# Patient Record
Sex: MALE | Race: WHITE
[De-identification: names, ages, dates, MRNs, and addresses within clinical notes are randomized per-mention and may not be internally consistent; named-entity substitution may affect disease eponyms.]

---

## 2018-02-19 ENCOUNTER — HOSPITAL ENCOUNTER (EMERGENCY)
Dept: HOSPITAL 18 - NAV ERS | Age: 23
Discharge: HOME | End: 2018-02-19
Payer: SELF-PAY

## 2018-02-19 DIAGNOSIS — J06.9: Primary | ICD-10-CM

## 2018-02-19 DIAGNOSIS — F17.210: ICD-10-CM

## 2018-02-19 DIAGNOSIS — J45.909: ICD-10-CM

## 2018-02-19 PROCEDURE — 99283 EMERGENCY DEPT VISIT LOW MDM: CPT

## 2018-06-05 ENCOUNTER — HOSPITAL ENCOUNTER (EMERGENCY)
Dept: HOSPITAL 18 - NAV ERS | Age: 23
Discharge: HOME | End: 2018-06-05
Payer: SELF-PAY

## 2018-06-05 DIAGNOSIS — J45.909: ICD-10-CM

## 2018-06-05 DIAGNOSIS — L01.00: Primary | ICD-10-CM

## 2018-06-05 DIAGNOSIS — F17.210: ICD-10-CM

## 2018-06-05 PROCEDURE — 90471 IMMUNIZATION ADMIN: CPT

## 2018-06-05 PROCEDURE — 90715 TDAP VACCINE 7 YRS/> IM: CPT

## 2018-06-11 ENCOUNTER — HOSPITAL ENCOUNTER (EMERGENCY)
Dept: HOSPITAL 18 - NAV ERS | Age: 23
Discharge: HOME | End: 2018-06-11
Payer: SELF-PAY

## 2018-06-11 DIAGNOSIS — F17.210: ICD-10-CM

## 2018-06-11 DIAGNOSIS — Z79.899: ICD-10-CM

## 2018-06-11 DIAGNOSIS — J45.909: ICD-10-CM

## 2018-06-11 DIAGNOSIS — L01.00: Primary | ICD-10-CM

## 2018-06-11 PROCEDURE — 99283 EMERGENCY DEPT VISIT LOW MDM: CPT

## 2018-07-16 ENCOUNTER — HOSPITAL ENCOUNTER (EMERGENCY)
Dept: HOSPITAL 18 - NAV ERS | Age: 23
Discharge: HOME | End: 2018-07-16
Payer: SELF-PAY

## 2018-07-16 DIAGNOSIS — F17.210: ICD-10-CM

## 2018-07-16 DIAGNOSIS — J45.909: ICD-10-CM

## 2018-07-16 DIAGNOSIS — R36.9: Primary | ICD-10-CM

## 2018-07-16 PROCEDURE — 96372 THER/PROPH/DIAG INJ SC/IM: CPT

## 2018-07-16 PROCEDURE — 87591 N.GONORRHOEAE DNA AMP PROB: CPT

## 2018-07-16 PROCEDURE — 87491 CHLMYD TRACH DNA AMP PROBE: CPT

## 2018-07-16 PROCEDURE — 86780 TREPONEMA PALLIDUM: CPT

## 2018-07-16 PROCEDURE — 86593 SYPHILIS TEST NON-TREP QUANT: CPT

## 2018-07-17 LAB
SYPHILIS ANTIBODY INDEX: 27.26 S/CO
T PALLIDUM AB TITR SER: (no result) {TITER}

## 2018-07-19 LAB — N GONORRHOEA DNA SPEC QL NAA+PROBE: DETECTED

## 2018-09-14 ENCOUNTER — HOSPITAL ENCOUNTER (EMERGENCY)
Dept: HOSPITAL 18 - NAV ERS | Age: 23
Discharge: HOME | End: 2018-09-14
Payer: SELF-PAY

## 2018-09-14 DIAGNOSIS — J01.90: Primary | ICD-10-CM

## 2018-09-14 DIAGNOSIS — F17.210: ICD-10-CM

## 2018-09-14 DIAGNOSIS — J45.909: ICD-10-CM

## 2018-09-14 PROCEDURE — 99283 EMERGENCY DEPT VISIT LOW MDM: CPT

## 2018-12-14 ENCOUNTER — HOSPITAL ENCOUNTER (EMERGENCY)
Dept: HOSPITAL 18 - NAV ERS | Age: 23
Discharge: HOME | End: 2018-12-14
Payer: SELF-PAY

## 2018-12-14 DIAGNOSIS — J06.9: Primary | ICD-10-CM

## 2018-12-14 DIAGNOSIS — F17.210: ICD-10-CM

## 2018-12-14 DIAGNOSIS — J45.909: ICD-10-CM

## 2018-12-14 PROCEDURE — 99281 EMR DPT VST MAYX REQ PHY/QHP: CPT

## 2018-12-31 ENCOUNTER — HOSPITAL ENCOUNTER (EMERGENCY)
Dept: HOSPITAL 18 - NAV ERS | Age: 23
Discharge: HOME | End: 2018-12-31
Payer: SELF-PAY

## 2018-12-31 DIAGNOSIS — R05: Primary | ICD-10-CM

## 2018-12-31 DIAGNOSIS — J45.909: ICD-10-CM

## 2018-12-31 DIAGNOSIS — R50.9: ICD-10-CM

## 2018-12-31 DIAGNOSIS — F17.210: ICD-10-CM

## 2018-12-31 PROCEDURE — 71046 X-RAY EXAM CHEST 2 VIEWS: CPT

## 2018-12-31 NOTE — RAD
TWO VIEWS CHEST:

 

DATE: 12/31/2018.

 

PROVIDED CLINICAL HISTORY: 

Cough and congestion.

 

FINDINGS: 

Cardiac and mediastinal silhouette is within normal limits.  Lungs appear clear.  No pleural fluid or
 pneumothorax apparent.

 

IMPRESSION: 

No evidence for an acute cardiopulmonary process.

 

POS: C

## 2019-03-10 ENCOUNTER — HOSPITAL ENCOUNTER (EMERGENCY)
Dept: HOSPITAL 18 - NAV ERS | Age: 24
Discharge: HOME | End: 2019-03-10
Payer: SELF-PAY

## 2019-03-10 DIAGNOSIS — F17.210: ICD-10-CM

## 2019-03-10 DIAGNOSIS — B34.9: Primary | ICD-10-CM

## 2019-03-10 DIAGNOSIS — J45.909: ICD-10-CM

## 2019-03-10 PROCEDURE — 99281 EMR DPT VST MAYX REQ PHY/QHP: CPT

## 2019-12-09 ENCOUNTER — HOSPITAL ENCOUNTER (EMERGENCY)
Dept: HOSPITAL 18 - NAV ERS | Age: 24
Discharge: HOME | End: 2019-12-09
Payer: SELF-PAY

## 2019-12-09 DIAGNOSIS — F17.210: ICD-10-CM

## 2019-12-09 DIAGNOSIS — R11.0: ICD-10-CM

## 2019-12-09 DIAGNOSIS — R05: Primary | ICD-10-CM

## 2019-12-09 DIAGNOSIS — J45.909: ICD-10-CM

## 2019-12-09 PROCEDURE — 99281 EMR DPT VST MAYX REQ PHY/QHP: CPT

## 2020-07-31 ENCOUNTER — HOSPITAL ENCOUNTER (EMERGENCY)
Dept: HOSPITAL 18 - NAV ERS | Age: 25
Discharge: HOME | End: 2020-07-31
Payer: SELF-PAY

## 2020-07-31 DIAGNOSIS — Z20.828: Primary | ICD-10-CM

## 2020-07-31 DIAGNOSIS — J45.909: ICD-10-CM

## 2020-07-31 PROCEDURE — U0003 INFECTIOUS AGENT DETECTION BY NUCLEIC ACID (DNA OR RNA); SEVERE ACUTE RESPIRATORY SYNDROME CORONAVIRUS 2 (SARS-COV-2) (CORONAVIRUS DISEASE [COVID-19]), AMPLIFIED PROBE TECHNIQUE, MAKING USE OF HIGH THROUGHPUT TECHNOLOGIES AS DESCRIBED BY CMS-2020-01-R: HCPCS

## 2020-07-31 PROCEDURE — 87635 SARS-COV-2 COVID-19 AMP PRB: CPT

## 2020-07-31 PROCEDURE — 99283 EMERGENCY DEPT VISIT LOW MDM: CPT

## 2020-08-29 ENCOUNTER — HOSPITAL ENCOUNTER (EMERGENCY)
Dept: HOSPITAL 18 - NAV ERS | Age: 25
Discharge: HOME | End: 2020-08-29
Payer: COMMERCIAL

## 2020-08-29 DIAGNOSIS — Z79.899: ICD-10-CM

## 2020-08-29 DIAGNOSIS — J36: Primary | ICD-10-CM

## 2020-08-29 DIAGNOSIS — J45.909: ICD-10-CM

## 2020-08-29 LAB
ALBUMIN SERPL BCG-MCNC: 4.6 G/DL (ref 3.5–5)
ALP SERPL-CCNC: 94 U/L (ref 40–110)
ALT SERPL W P-5'-P-CCNC: 13 U/L (ref 8–55)
ANION GAP SERPL CALC-SCNC: 16 MMOL/L (ref 10–20)
AST SERPL-CCNC: 15 U/L (ref 5–34)
BASOPHILS # BLD AUTO: 0.1 THOU/UL (ref 0–0.2)
BASOPHILS NFR BLD AUTO: 0.5 % (ref 0–1)
BILIRUB SERPL-MCNC: 0.8 MG/DL (ref 0.2–1.2)
BUN SERPL-MCNC: 6 MG/DL (ref 8.9–20.6)
CALCIUM SERPL-MCNC: 10.1 MG/DL (ref 7.8–10.44)
CHLORIDE SERPL-SCNC: 106 MMOL/L (ref 98–107)
CO2 SERPL-SCNC: 23 MMOL/L (ref 22–29)
CREAT CL PREDICTED SERPL C-G-VRATE: 0 ML/MIN (ref 70–130)
EOSINOPHIL # BLD AUTO: 0.3 THOU/UL (ref 0–0.7)
EOSINOPHIL NFR BLD AUTO: 1.2 % (ref 0–10)
GLOBULIN SER CALC-MCNC: 3.7 G/DL (ref 2.4–3.5)
GLUCOSE SERPL-MCNC: 103 MG/DL (ref 70–105)
HGB BLD-MCNC: 17.8 G/DL (ref 14–18)
LYMPHOCYTES # BLD AUTO: 2.7 THOU/UL (ref 1.2–3.4)
LYMPHOCYTES NFR BLD AUTO: 12.9 % (ref 21–51)
MCH RBC QN AUTO: 31 PG (ref 27–31)
MCV RBC AUTO: 97.1 FL (ref 78–98)
MONOCYTES # BLD AUTO: 1.4 THOU/UL (ref 0.11–0.59)
MONOCYTES NFR BLD AUTO: 6.6 % (ref 0–10)
NEUTROPHILS # BLD AUTO: 16.3 THOU/UL (ref 1.4–6.5)
NEUTROPHILS NFR BLD AUTO: 78.8 % (ref 42–75)
PLATELET # BLD AUTO: 301 THOU/UL (ref 130–400)
POTASSIUM SERPL-SCNC: 4.2 MMOL/L (ref 3.5–5.1)
RBC # BLD AUTO: 5.75 MILL/UL (ref 4.7–6.1)
SODIUM SERPL-SCNC: 141 MMOL/L (ref 136–145)
WBC # BLD AUTO: 20.6 THOU/UL (ref 4.8–10.8)

## 2020-08-29 PROCEDURE — 85025 COMPLETE CBC W/AUTO DIFF WBC: CPT

## 2020-08-29 PROCEDURE — 80053 COMPREHEN METABOLIC PANEL: CPT

## 2020-08-29 PROCEDURE — 96375 TX/PRO/DX INJ NEW DRUG ADDON: CPT

## 2020-08-29 PROCEDURE — 96374 THER/PROPH/DIAG INJ IV PUSH: CPT

## 2020-08-29 PROCEDURE — 83605 ASSAY OF LACTIC ACID: CPT

## 2020-08-29 PROCEDURE — 87430 STREP A AG IA: CPT

## 2020-08-29 PROCEDURE — 70492 CT SFT TSUE NCK W/O & W/DYE: CPT

## 2020-08-29 NOTE — CT
EXAM: CT NECK SOFT TISSUE POST CONTRAST:



HISTORY:Positive strep throat. Swelling. Pain.



COMPARISON:None



CORRELATION:None





FINDINGS:

Brain parenchyma: No pathologic enhancement of the visualized brain parenchyma.





Sinuses: Mucus retention cyst in the right maxillary sinus. Minimal mucosal thickening of the left ma
xillary sinus.



Orbits: Appropriate location of the ocular lenses. Symmetric attenuation the optic nerves and ocular 
rectus muscles. Retrobulbar fat is preserved.



Nasopharynx:Adequate aeration. No mucosal abnormality. 



Oral cavity:There is a submucosal peripherally enhancing centrally hypodense lesion at the level of t
he right tonsillar pillar compatible with a tonsillar/peritonsillar abscess measuring 1.6 x 1.8 cm.

There is associated mass effect and narrowing of the airway. 



Hypopharynx: There is mucosal thickening involving the posterior and right aspect of the hypopharynx.
 There is mild thickening of the epiglottis. There is effacement of the right piriform sinus..



Larynx: True vocal cords appear to have a normal appearance.



Paraspinal muscles: Symmetric attenuation of the paraspinal muscles and symmetric attenuation of the 
sternocleidomastoid muscles..



Parotid and salivary glands: Symmetric attenuation of the parotid and submandibular glands





Thyroid gland: Unremarkable.



Spine: Vertebral body height is maintained. No fracture. No significant central canal stenosis or sig
nificant neural foraminal narrowing. Limited evaluation due to technique.



Lymph nodes: Enlarged right level 2 lymph node measuring 1.7 x 1.6 cm, 1.0 x 1.8 cm. Enlarged left le
raz 2 lymph node measuring 1.2 x 1.1 cm. Fullness of the lingual tonsils is also noted suggesting

reactive lymphadenopathy.



Lung apices and upper mediastinum: No acute abnormality.





IMPRESSION:



1. Submucosal enhancement at the level of right tonsillar pillar compatible with a tonsillar/peritons
illar abscess.

2. Narrowing of the aerodigestive tract secondary to aforementioned peritonsillar/tonsillar abscess.

3. Reactive lymphadenopathy.



Transcribed Date/Time: 8/29/2020 1:39 PM



Reported By: Jason Little 

Electronically Signed:  8/29/2020 1:52 PM

## 2021-05-05 ENCOUNTER — HOSPITAL ENCOUNTER (EMERGENCY)
Dept: HOSPITAL 18 - NAV ERS | Age: 26
Discharge: HOME | End: 2021-05-05
Payer: COMMERCIAL

## 2021-05-05 DIAGNOSIS — R05: Primary | ICD-10-CM

## 2021-05-05 DIAGNOSIS — Z20.822: ICD-10-CM

## 2021-05-05 PROCEDURE — 99406 BEHAV CHNG SMOKING 3-10 MIN: CPT

## 2021-05-05 PROCEDURE — U0003 INFECTIOUS AGENT DETECTION BY NUCLEIC ACID (DNA OR RNA); SEVERE ACUTE RESPIRATORY SYNDROME CORONAVIRUS 2 (SARS-COV-2) (CORONAVIRUS DISEASE [COVID-19]), AMPLIFIED PROBE TECHNIQUE, MAKING USE OF HIGH THROUGHPUT TECHNOLOGIES AS DESCRIBED BY CMS-2020-01-R: HCPCS

## 2021-05-05 PROCEDURE — 87635 SARS-COV-2 COVID-19 AMP PRB: CPT

## 2021-05-05 PROCEDURE — U0005 INFEC AGEN DETEC AMPLI PROBE: HCPCS

## 2021-10-12 ENCOUNTER — HOSPITAL ENCOUNTER (EMERGENCY)
Dept: HOSPITAL 18 - NAV ERS | Age: 26
Discharge: HOME | End: 2021-10-12
Payer: COMMERCIAL

## 2021-10-12 DIAGNOSIS — F17.210: ICD-10-CM

## 2021-10-12 DIAGNOSIS — Z20.822: ICD-10-CM

## 2021-10-12 DIAGNOSIS — M79.10: ICD-10-CM

## 2021-10-12 DIAGNOSIS — R50.9: Primary | ICD-10-CM

## 2021-10-12 DIAGNOSIS — J45.909: ICD-10-CM

## 2021-10-12 DIAGNOSIS — R19.7: ICD-10-CM

## 2021-10-12 DIAGNOSIS — R43.8: ICD-10-CM

## 2021-10-12 PROCEDURE — U0003 INFECTIOUS AGENT DETECTION BY NUCLEIC ACID (DNA OR RNA); SEVERE ACUTE RESPIRATORY SYNDROME CORONAVIRUS 2 (SARS-COV-2) (CORONAVIRUS DISEASE [COVID-19]), AMPLIFIED PROBE TECHNIQUE, MAKING USE OF HIGH THROUGHPUT TECHNOLOGIES AS DESCRIBED BY CMS-2020-01-R: HCPCS

## 2021-10-12 PROCEDURE — 99284 EMERGENCY DEPT VISIT MOD MDM: CPT

## 2021-10-12 PROCEDURE — U0005 INFEC AGEN DETEC AMPLI PROBE: HCPCS

## 2021-12-22 ENCOUNTER — HOSPITAL ENCOUNTER (EMERGENCY)
Dept: HOSPITAL 18 - NAV ERS | Age: 26
Discharge: HOME | End: 2021-12-22
Payer: SELF-PAY

## 2021-12-22 DIAGNOSIS — R10.32: ICD-10-CM

## 2021-12-22 DIAGNOSIS — J06.9: ICD-10-CM

## 2021-12-22 DIAGNOSIS — Z20.822: ICD-10-CM

## 2021-12-22 DIAGNOSIS — R10.31: Primary | ICD-10-CM

## 2021-12-22 DIAGNOSIS — F17.210: ICD-10-CM

## 2021-12-22 DIAGNOSIS — J45.909: ICD-10-CM

## 2021-12-22 PROCEDURE — 87804 INFLUENZA ASSAY W/OPTIC: CPT

## 2021-12-22 PROCEDURE — U0003 INFECTIOUS AGENT DETECTION BY NUCLEIC ACID (DNA OR RNA); SEVERE ACUTE RESPIRATORY SYNDROME CORONAVIRUS 2 (SARS-COV-2) (CORONAVIRUS DISEASE [COVID-19]), AMPLIFIED PROBE TECHNIQUE, MAKING USE OF HIGH THROUGHPUT TECHNOLOGIES AS DESCRIBED BY CMS-2020-01-R: HCPCS

## 2021-12-22 PROCEDURE — U0005 INFEC AGEN DETEC AMPLI PROBE: HCPCS

## 2021-12-22 PROCEDURE — 99283 EMERGENCY DEPT VISIT LOW MDM: CPT

## 2022-11-12 ENCOUNTER — HOSPITAL ENCOUNTER (EMERGENCY)
Dept: HOSPITAL 18 - NAV ERS | Age: 27
Discharge: HOME | End: 2022-11-12
Payer: SELF-PAY

## 2022-11-12 DIAGNOSIS — J45.909: ICD-10-CM

## 2022-11-12 DIAGNOSIS — F17.210: ICD-10-CM

## 2022-11-12 DIAGNOSIS — J11.1: Primary | ICD-10-CM

## 2022-11-12 PROCEDURE — 71046 X-RAY EXAM CHEST 2 VIEWS: CPT
